# Patient Record
Sex: MALE | Race: WHITE | NOT HISPANIC OR LATINO | Employment: FULL TIME
[De-identification: names, ages, dates, MRNs, and addresses within clinical notes are randomized per-mention and may not be internally consistent; named-entity substitution may affect disease eponyms.]

---

## 2018-06-13 ENCOUNTER — CHARTING TRANS (OUTPATIENT)
Dept: OTHER | Age: 28
End: 2018-06-13

## 2018-11-19 ENCOUNTER — CHARTING TRANS (OUTPATIENT)
Dept: OTHER | Age: 28
End: 2018-11-19

## 2018-12-07 VITALS
HEIGHT: 70 IN | WEIGHT: 189.99 LBS | SYSTOLIC BLOOD PRESSURE: 120 MMHG | BODY MASS INDEX: 27.2 KG/M2 | DIASTOLIC BLOOD PRESSURE: 78 MMHG | RESPIRATION RATE: 16 BRPM | HEART RATE: 78 BPM

## 2019-02-28 ENCOUNTER — TELEPHONE (OUTPATIENT)
Dept: SCHEDULING | Age: 29
End: 2019-02-28

## 2019-03-01 ENCOUNTER — WALK IN (OUTPATIENT)
Dept: URGENT CARE | Age: 29
End: 2019-03-01

## 2019-03-01 ENCOUNTER — TELEPHONE (OUTPATIENT)
Dept: SCHEDULING | Age: 29
End: 2019-03-01

## 2019-03-01 VITALS
TEMPERATURE: 99.4 F | DIASTOLIC BLOOD PRESSURE: 80 MMHG | SYSTOLIC BLOOD PRESSURE: 118 MMHG | HEART RATE: 90 BPM | RESPIRATION RATE: 16 BRPM | HEIGHT: 70 IN | BODY MASS INDEX: 27.2 KG/M2 | OXYGEN SATURATION: 98 % | WEIGHT: 190 LBS

## 2019-03-01 DIAGNOSIS — R05.9 COUGH: ICD-10-CM

## 2019-03-01 DIAGNOSIS — R68.89 INFLUENZA-LIKE SYMPTOMS: Primary | ICD-10-CM

## 2019-03-01 LAB
FLUAV AG UPPER RESP QL IA.RAPID: NEGATIVE
FLUBV AG UPPER RESP QL IA.RAPID: NEGATIVE

## 2019-03-01 PROCEDURE — 99213 OFFICE O/P EST LOW 20 MIN: CPT | Performed by: NURSE PRACTITIONER

## 2019-03-01 PROCEDURE — 87804 INFLUENZA ASSAY W/OPTIC: CPT | Performed by: NURSE PRACTITIONER

## 2019-03-01 RX ORDER — DEXTROAMPHETAMINE SACCHARATE, AMPHETAMINE ASPARTATE MONOHYDRATE, DEXTROAMPHETAMINE SULFATE AND AMPHETAMINE SULFATE 5; 5; 5; 5 MG/1; MG/1; MG/1; MG/1
CAPSULE, EXTENDED RELEASE ORAL
COMMUNITY
End: 2022-03-23

## 2019-03-01 ASSESSMENT — ENCOUNTER SYMPTOMS
SORE THROAT: 0
CHILLS: 1
COUGH: 1
FATIGUE: 1
VOICE CHANGE: 1
DIAPHORESIS: 1
HEADACHES: 0
TROUBLE SWALLOWING: 0
FEVER: 1
WEAKNESS: 1

## 2019-03-09 ENCOUNTER — TELEPHONE (OUTPATIENT)
Dept: SCHEDULING | Age: 29
End: 2019-03-09

## 2020-04-15 ENCOUNTER — TELEPHONE (OUTPATIENT)
Dept: SCHEDULING | Age: 30
End: 2020-04-15

## 2020-04-15 ENCOUNTER — OFFICE VISIT (OUTPATIENT)
Dept: INTERNAL MEDICINE | Age: 30
End: 2020-04-15

## 2020-04-15 VITALS — BODY MASS INDEX: 27.2 KG/M2 | HEIGHT: 70 IN | WEIGHT: 190 LBS

## 2020-04-15 DIAGNOSIS — F12.90 MARIJUANA USE: ICD-10-CM

## 2020-04-15 DIAGNOSIS — J34.89 SINUS PRESSURE: ICD-10-CM

## 2020-04-15 DIAGNOSIS — R09.81 NASAL CONGESTION: Primary | ICD-10-CM

## 2020-04-15 PROCEDURE — 99213 OFFICE O/P EST LOW 20 MIN: CPT | Performed by: INTERNAL MEDICINE

## 2020-04-15 RX ORDER — FLUTICASONE PROPIONATE 50 MCG
2 SPRAY, SUSPENSION (ML) NASAL DAILY
Qty: 16 G | Refills: 3 | Status: SHIPPED | OUTPATIENT
Start: 2020-04-15 | End: 2021-07-22 | Stop reason: SDUPTHER

## 2020-04-15 ASSESSMENT — ENCOUNTER SYMPTOMS
SPEECH DIFFICULTY: 0
EYE ITCHING: 0
TROUBLE SWALLOWING: 0
VOMITING: 0
POLYDIPSIA: 0
BLOOD IN STOOL: 0
FATIGUE: 0
ACTIVITY CHANGE: 0
FEVER: 0
LIGHT-HEADEDNESS: 0
NAUSEA: 0
CHILLS: 0
EYE REDNESS: 0
POLYPHAGIA: 0
WEAKNESS: 0
DIZZINESS: 0
EYE DISCHARGE: 0
CHEST TIGHTNESS: 0
SINUS PAIN: 0
VOICE CHANGE: 0
DIARRHEA: 0
NERVOUS/ANXIOUS: 0
COLOR CHANGE: 0
HEADACHES: 0
SHORTNESS OF BREATH: 0
SINUS PRESSURE: 1
EYE PAIN: 0
PHOTOPHOBIA: 0
BRUISES/BLEEDS EASILY: 0
ABDOMINAL PAIN: 0
COUGH: 0
APPETITE CHANGE: 0
TREMORS: 0
RECTAL PAIN: 0
CONSTIPATION: 0
ABDOMINAL DISTENTION: 0
NUMBNESS: 0
SLEEP DISTURBANCE: 0
SEIZURES: 0
UNEXPECTED WEIGHT CHANGE: 0
SORE THROAT: 0
BACK PAIN: 0
WHEEZING: 0

## 2020-04-15 ASSESSMENT — PATIENT HEALTH QUESTIONNAIRE - PHQ9
SUM OF ALL RESPONSES TO PHQ9 QUESTIONS 1 AND 2: 0
2. FEELING DOWN, DEPRESSED OR HOPELESS: NOT AT ALL
SUM OF ALL RESPONSES TO PHQ9 QUESTIONS 1 AND 2: 0
1. LITTLE INTEREST OR PLEASURE IN DOING THINGS: NOT AT ALL

## 2021-02-23 ENCOUNTER — OFFICE VISIT (OUTPATIENT)
Dept: FAMILY MEDICINE CLINIC | Facility: CLINIC | Age: 31
End: 2021-02-23
Payer: COMMERCIAL

## 2021-02-23 VITALS
SYSTOLIC BLOOD PRESSURE: 116 MMHG | WEIGHT: 200 LBS | HEART RATE: 49 BPM | DIASTOLIC BLOOD PRESSURE: 76 MMHG | TEMPERATURE: 99 F | BODY MASS INDEX: 28.63 KG/M2 | HEIGHT: 70 IN

## 2021-02-23 DIAGNOSIS — Z11.1 SCREENING EXAMINATION FOR PULMONARY TUBERCULOSIS: Primary | ICD-10-CM

## 2021-02-23 PROCEDURE — 3078F DIAST BP <80 MM HG: CPT | Performed by: NURSE PRACTITIONER

## 2021-02-23 PROCEDURE — 3008F BODY MASS INDEX DOCD: CPT | Performed by: NURSE PRACTITIONER

## 2021-02-23 PROCEDURE — 99204 OFFICE O/P NEW MOD 45 MIN: CPT | Performed by: NURSE PRACTITIONER

## 2021-02-23 PROCEDURE — 86580 TB INTRADERMAL TEST: CPT | Performed by: NURSE PRACTITIONER

## 2021-02-23 PROCEDURE — 3074F SYST BP LT 130 MM HG: CPT | Performed by: NURSE PRACTITIONER

## 2021-02-23 NOTE — PROGRESS NOTES
HPI    Patient presents to establish care. Had a physical completed recently at a different clinic that did not offered TB skin testing. Review of Systems   Respiratory: Negative for cough and shortness of breath.     Cardiovascular: Negative for ches status: Not on file      Intimate partner violence        Fear of current or ex partner: Not on file        Emotionally abused: Not on file        Physically abused: Not on file        Forced sexual activity: Not on file    Other Topics      Concerns:

## 2021-02-23 NOTE — PATIENT INSTRUCTIONS
TB Screening (Skin)   Does this test have other names? Tuberculin test, TST, Mantoux, PPD (purified protein derivative)  What is this test?  This test shows if you have been infected with tuberculosis (TB).  TB is a very contagious bacterial infection th What do my test results mean? Test results may vary depending on your age, gender, health history, the method used for the test, and other things. Your test results may not mean you have a problem.  Ask your healthcare provider what your test results mean You don't need to prepare for this test.  Be sure your healthcare provider knows about all medicines, herbs, vitamins, and supplements you are taking.  This includes medicines that don't need a prescription and any illegal drugs you may use.   [employer] last

## 2021-02-25 ENCOUNTER — NURSE ONLY (OUTPATIENT)
Dept: FAMILY MEDICINE CLINIC | Facility: CLINIC | Age: 31
End: 2021-02-25
Payer: COMMERCIAL

## 2021-02-25 DIAGNOSIS — Z11.1 SCREENING-PULMONARY TB: Primary | ICD-10-CM

## 2021-02-25 LAB — INDURATION (): 0 MM (ref 0–11)

## 2021-02-25 NOTE — PROGRESS NOTES
Patient is here for tb reading. Patients full name and  verified. Tb placed on 21 at 1 pm on left forearm. Tb read negative. Patient requesting copies of results.

## 2021-04-11 ENCOUNTER — OFFICE VISIT (OUTPATIENT)
Dept: FAMILY MEDICINE CLINIC | Facility: CLINIC | Age: 31
End: 2021-04-11
Payer: COMMERCIAL

## 2021-04-11 VITALS
RESPIRATION RATE: 16 BRPM | HEART RATE: 51 BPM | BODY MASS INDEX: 28.63 KG/M2 | OXYGEN SATURATION: 98 % | TEMPERATURE: 97 F | HEIGHT: 70 IN | SYSTOLIC BLOOD PRESSURE: 120 MMHG | DIASTOLIC BLOOD PRESSURE: 74 MMHG | WEIGHT: 200 LBS

## 2021-04-11 DIAGNOSIS — J06.9 VIRAL URI WITH COUGH: Primary | ICD-10-CM

## 2021-04-11 PROCEDURE — 3078F DIAST BP <80 MM HG: CPT | Performed by: NURSE PRACTITIONER

## 2021-04-11 PROCEDURE — 87880 STREP A ASSAY W/OPTIC: CPT | Performed by: NURSE PRACTITIONER

## 2021-04-11 PROCEDURE — 3008F BODY MASS INDEX DOCD: CPT | Performed by: NURSE PRACTITIONER

## 2021-04-11 PROCEDURE — 3074F SYST BP LT 130 MM HG: CPT | Performed by: NURSE PRACTITIONER

## 2021-04-11 PROCEDURE — 99202 OFFICE O/P NEW SF 15 MIN: CPT | Performed by: NURSE PRACTITIONER

## 2021-04-11 NOTE — PROGRESS NOTES
CHIEF COMPLAINT:   Patient presents with:  Upper Respiratory Infection: Cough, sore/swollen throat since yesterday.       HPI:   Marley Sullivan is a 31year old male who presents to [facility] for COVID-19 testing with symptoms/history as described below:  Onset: 1 da CARDIOVASCULAR: denies palpitations; see HPI  GI: see HPI  NEURO: Denies dizziness; see HPI    EXAM:   /74   Pulse 51   Temp 97.3 °F (36.3 °C)   Resp 16   Ht 5' 10\" (1.778 m)   Wt 200 lb (90.7 kg)   SpO2 98%   BMI 28.70 kg/m²   GENERAL: Non-toxic, w contagious.   - Supportive care as described in Patient Instructions and as outlined below. - If applicable, notify employer/school of testing.   - Advised to follow up with PCP or IC for reevaluation for persistent/worsening symptoms within 1 week.   Renaye Cowden When you resume activity, don't let yourself get too tired. · Don't smoke. If you need help stopping, talk with your healthcare provider. · Avoid being exposed to cigarette smoke (yours or others’).   · You may use acetaminophen or ibuprofen to control pa 4178-4629 [facility] 4037. All rights reserved. This information is not intended as a substitute for professional medical care. Always follow your healthcare professional's instructions.

## 2021-04-11 NOTE — PATIENT INSTRUCTIONS
- Negative strep testing.  - COVID testing pending---- expect results within 24-48 hours. Viral Upper Respiratory Illness (Adult)    You have a viral upper respiratory illness (URI), which is another term for the common cold.  This illness is contagiou healthcare provider or pharmacist which over-the-counter medicines are safe to use. (Note: Don't use decongestants if you have high blood pressure.)  Follow-up care  Follow up with your healthcare provider, or as advised.   When to seek medical advice  Call

## 2021-05-23 ENCOUNTER — OFFICE VISIT (OUTPATIENT)
Dept: FAMILY MEDICINE CLINIC | Facility: CLINIC | Age: 31
End: 2021-05-23
Payer: COMMERCIAL

## 2021-05-23 VITALS
OXYGEN SATURATION: 98 % | SYSTOLIC BLOOD PRESSURE: 114 MMHG | WEIGHT: 205 LBS | HEART RATE: 53 BPM | HEIGHT: 70 IN | TEMPERATURE: 98 F | BODY MASS INDEX: 29.35 KG/M2 | DIASTOLIC BLOOD PRESSURE: 66 MMHG | RESPIRATION RATE: 20 BRPM

## 2021-05-23 DIAGNOSIS — B97.89 VIRAL SINUSITIS: Primary | ICD-10-CM

## 2021-05-23 DIAGNOSIS — J32.9 VIRAL SINUSITIS: Primary | ICD-10-CM

## 2021-05-23 PROCEDURE — 3074F SYST BP LT 130 MM HG: CPT | Performed by: NURSE PRACTITIONER

## 2021-05-23 PROCEDURE — 99212 OFFICE O/P EST SF 10 MIN: CPT | Performed by: NURSE PRACTITIONER

## 2021-05-23 PROCEDURE — 3078F DIAST BP <80 MM HG: CPT | Performed by: NURSE PRACTITIONER

## 2021-05-23 PROCEDURE — 3008F BODY MASS INDEX DOCD: CPT | Performed by: NURSE PRACTITIONER

## 2021-05-23 NOTE — PATIENT INSTRUCTIONS
Sinusitis (No Antibiotics)    The sinuses are air-filled spaces in the bones of the face. They connect to the inside of the nose. Sinusitis is redness and swelling (inflammation) of the tissue that lines the sinuses.  It can occur during a cold. It can al the healthcare provider, may help if allergies helped cause your sinusitis. · Use acetaminophen or ibuprofen to control pain, unless another pain medicine was prescribed to you.  If you have long-term (chronic) liver or kidney disease, or ever had a stomac mucus. This lets it drain from your sinuses more easily. Have a glass of water every hour or two. A humidifier helps in much the same way. Fluids can also offset the drying effects of certain medicines.  If you use a humidifier, follow the product maker's i instructions. You may take with food to prevent stomach upset. Try to avoid taking the medicine at bedtime. Store at room temperature in a dry place. Do not keep in the bathroom. Keep the medicine away from heat and light.   Drink plenty of water while pregnant, planning to be pregnant, or breastfeeding. Ask your pharmacist if this medicine can interact with any of your other medicines. Be sure to tell them about all the medicines you take.   Please tell all your doctors and dentists that you are on this description of this medicine available in [place]. Scan this code on your smartphone or tablet or use the web address below. You can also ask your pharmacist for a printout.  If you have any questions, please ask your pharmacist.   © 2021 [address]

## 2021-05-24 NOTE — PROGRESS NOTES
CHIEF COMPLAINT:   Patient presents with:  Nasal Congestion: seasonal allergies, worse in last 3 days, no fever, white discharge, no sinus pain      HPI:   Linn Trujillo is a 31year old male who presents for cold symptoms for  3  days.  Symptoms have progress Posterior pharynx is no erythematous. no exudates. NECK: supple, non-tender  LUNGS: clear to auscultation bilaterally, no wheezes or rhonchi. Breathing is non labored.   CARDIO: RRR without murmur  EXTREMITIES: no cyanosis, clubbing or edema  LYMPH:  no ly cause a low-grade fever. Your sinusitis doesn't include an infection with bacteria. So antibiotics aren't used to treat this problem. Home care  · Drink plenty of water, hot tea, and other liquids, as directed by the healthcare provider.  This may help th This is a rare but very serious disorder that most often affects the brain and the liver. · Use nasal rinses or irrigation as instructed by your provider. · Don't smoke. This can make symptoms worse.     Follow-up care  Follow up with your healthcare prov teaspoon of salt in 8 ounces of fresh, warm water. Use a bulb syringe to gently squirt the water into your nose a few times a day. You can also buy ready-made saline nasal sprays.    Apply hot or cold packs  Applying heat to the area surrounding your sinuse schedule. Do not take 2 doses of this medicine at one time. Please tell your doctor and pharmacist about all the medicines you take. Include both prescription and over-the-counter medicines.  Also tell them about any vitamins, herbal medicines, or anything effects from this medicine. Please speak with your doctor about what you should do if you experience these or other side effects.   · headaches  · high blood pressure  · nausea  · stomach upset or abdominal pain  If you have any of the following side effect

## 2021-07-22 ENCOUNTER — WALK IN (OUTPATIENT)
Dept: URGENT CARE | Age: 31
End: 2021-07-22

## 2021-07-22 VITALS
SYSTOLIC BLOOD PRESSURE: 90 MMHG | DIASTOLIC BLOOD PRESSURE: 70 MMHG | HEART RATE: 65 BPM | TEMPERATURE: 98 F | RESPIRATION RATE: 16 BRPM | HEIGHT: 70 IN | BODY MASS INDEX: 28.61 KG/M2 | OXYGEN SATURATION: 98 % | WEIGHT: 199.85 LBS

## 2021-07-22 DIAGNOSIS — J02.9 SORE THROAT: ICD-10-CM

## 2021-07-22 DIAGNOSIS — J01.90 ACUTE NON-RECURRENT SINUSITIS, UNSPECIFIED LOCATION: ICD-10-CM

## 2021-07-22 DIAGNOSIS — Z00.00 PE (PHYSICAL EXAM), ROUTINE: Primary | ICD-10-CM

## 2021-07-22 PROCEDURE — X0943 AMG SELF PAY VISIT: HCPCS | Performed by: NURSE PRACTITIONER

## 2021-07-22 RX ORDER — AZITHROMYCIN 250 MG/1
TABLET, FILM COATED ORAL
Qty: 6 TABLET | Refills: 0 | Status: SHIPPED | OUTPATIENT
Start: 2021-07-22 | End: 2022-03-23

## 2021-07-22 RX ORDER — FLUTICASONE PROPIONATE 50 MCG
2 SPRAY, SUSPENSION (ML) NASAL DAILY
Qty: 16 G | Refills: 0 | Status: SHIPPED | OUTPATIENT
Start: 2021-07-22 | End: 2021-08-01

## 2021-07-22 ASSESSMENT — ENCOUNTER SYMPTOMS
TROUBLE SWALLOWING: 0
VOICE CHANGE: 0
WHEEZING: 0
PSYCHIATRIC NEGATIVE: 1
SINUS PRESSURE: 1
SHORTNESS OF BREATH: 0
HEMATOLOGIC/LYMPHATIC NEGATIVE: 1
SORE THROAT: 1
ENDOCRINE NEGATIVE: 1
EYES NEGATIVE: 1
CHEST TIGHTNESS: 0
GASTROINTESTINAL NEGATIVE: 1
ALLERGIC/IMMUNOLOGIC NEGATIVE: 1
CONSTITUTIONAL NEGATIVE: 1
SINUS PAIN: 1
STRIDOR: 0
COUGH: 1
NEUROLOGICAL NEGATIVE: 1

## 2021-09-25 ENCOUNTER — TELEPHONE (OUTPATIENT)
Dept: SCHEDULING | Age: 31
End: 2021-09-25

## 2022-02-04 ENCOUNTER — APPOINTMENT (OUTPATIENT)
Dept: URGENT CARE | Age: 32
End: 2022-02-04

## 2022-02-04 ENCOUNTER — TELEPHONE (OUTPATIENT)
Dept: SCHEDULING | Age: 32
End: 2022-02-04

## 2022-03-17 ENCOUNTER — HOSPITAL ENCOUNTER (OUTPATIENT)
Dept: CV DIAGNOSTICS | Facility: HOSPITAL | Age: 32
Discharge: HOME | End: 2022-03-17
Attending: INTERNAL MEDICINE
Payer: COMMERCIAL

## 2022-03-17 DIAGNOSIS — R00.1 BRADYCARDIA, SINUS: ICD-10-CM

## 2022-03-17 PROCEDURE — 93306 TTE W/DOPPLER COMPLETE: CPT | Performed by: INTERNAL MEDICINE

## 2022-03-23 ENCOUNTER — HOSPITAL ENCOUNTER (EMERGENCY)
Age: 32
Discharge: HOME | End: 2022-03-24
Attending: EMERGENCY MEDICINE

## 2022-03-23 ENCOUNTER — APPOINTMENT (OUTPATIENT)
Dept: GENERAL RADIOLOGY | Age: 32
End: 2022-03-23
Attending: EMERGENCY MEDICINE

## 2022-03-23 DIAGNOSIS — R06.00 DYSPNEA, UNSPECIFIED TYPE: Primary | ICD-10-CM

## 2022-03-23 DIAGNOSIS — R00.2 PALPITATIONS: ICD-10-CM

## 2022-03-23 LAB
BASOPHILS # BLD: 0.1 K/MCL (ref 0–0.3)
BASOPHILS NFR BLD: 1 %
DEPRECATED RDW RBC: 35.2 FL (ref 39–50)
EOSINOPHIL # BLD: 0.3 K/MCL (ref 0–0.5)
EOSINOPHIL NFR BLD: 2 %
ERYTHROCYTE [DISTWIDTH] IN BLOOD: 11.7 % (ref 11–15)
HCT VFR BLD CALC: 42 % (ref 39–51)
HGB BLD-MCNC: 14.9 G/DL (ref 13–17)
IMM GRANULOCYTES # BLD AUTO: 0 K/MCL (ref 0–0.2)
IMM GRANULOCYTES # BLD: 0 %
LYMPHOCYTES # BLD: 3.7 K/MCL (ref 1–4.8)
LYMPHOCYTES NFR BLD: 35 %
MCH RBC QN AUTO: 29.8 PG (ref 26–34)
MCHC RBC AUTO-ENTMCNC: 35.5 G/DL (ref 32–36.5)
MCV RBC AUTO: 84 FL (ref 78–100)
MONOCYTES # BLD: 0.8 K/MCL (ref 0.3–0.9)
MONOCYTES NFR BLD: 7 %
NEUTROPHILS # BLD: 6 K/MCL (ref 1.8–7.7)
NEUTROPHILS NFR BLD: 55 %
NRBC BLD MANUAL-RTO: 0 /100 WBC
PLATELET # BLD AUTO: 321 K/MCL (ref 140–450)
RBC # BLD: 5 MIL/MCL (ref 4.5–5.9)
WBC # BLD: 10.8 K/MCL (ref 4.2–11)

## 2022-03-23 PROCEDURE — 83735 ASSAY OF MAGNESIUM: CPT | Performed by: EMERGENCY MEDICINE

## 2022-03-23 PROCEDURE — 96374 THER/PROPH/DIAG INJ IV PUSH: CPT

## 2022-03-23 PROCEDURE — 80048 BASIC METABOLIC PNL TOTAL CA: CPT | Performed by: EMERGENCY MEDICINE

## 2022-03-23 PROCEDURE — 93010 ELECTROCARDIOGRAM REPORT: CPT | Performed by: INTERNAL MEDICINE

## 2022-03-23 PROCEDURE — 93005 ELECTROCARDIOGRAM TRACING: CPT | Performed by: EMERGENCY MEDICINE

## 2022-03-23 PROCEDURE — 85025 COMPLETE CBC W/AUTO DIFF WBC: CPT | Performed by: EMERGENCY MEDICINE

## 2022-03-23 PROCEDURE — 84484 ASSAY OF TROPONIN QUANT: CPT | Performed by: EMERGENCY MEDICINE

## 2022-03-23 PROCEDURE — 71045 X-RAY EXAM CHEST 1 VIEW: CPT

## 2022-03-23 PROCEDURE — 99284 EMERGENCY DEPT VISIT MOD MDM: CPT

## 2022-03-24 VITALS
SYSTOLIC BLOOD PRESSURE: 119 MMHG | OXYGEN SATURATION: 97 % | DIASTOLIC BLOOD PRESSURE: 80 MMHG | HEART RATE: 46 BPM | TEMPERATURE: 98.1 F | RESPIRATION RATE: 15 BRPM

## 2022-03-24 PROBLEM — R51.9 FREQUENT HEADACHES: Status: ACTIVE | Noted: 2018-10-30

## 2022-03-24 PROBLEM — R06.83 SNORING: Status: ACTIVE | Noted: 2018-10-30

## 2022-03-24 PROBLEM — G47.10 HYPERSOMNIA: Status: ACTIVE | Noted: 2018-10-30

## 2022-03-24 PROBLEM — R53.83 FATIGUE: Status: ACTIVE | Noted: 2018-10-30

## 2022-03-24 LAB
ANION GAP SERPL CALC-SCNC: 8 MMOL/L (ref 10–20)
ATRIAL RATE (BPM): 58
BUN SERPL-MCNC: 14 MG/DL (ref 6–20)
BUN/CREAT SERPL: 13 (ref 7–25)
CALCIUM SERPL-MCNC: 9.4 MG/DL (ref 8.4–10.2)
CHLORIDE SERPL-SCNC: 110 MMOL/L (ref 98–107)
CO2 SERPL-SCNC: 26 MMOL/L (ref 21–32)
CREAT SERPL-MCNC: 1.11 MG/DL (ref 0.67–1.17)
FASTING DURATION TIME PATIENT: ABNORMAL H
GFR SERPLBLD BASED ON 1.73 SQ M-ARVRAT: 89 ML/MIN
GLUCOSE SERPL-MCNC: 97 MG/DL (ref 70–99)
MAGNESIUM SERPL-MCNC: 2 MG/DL (ref 1.7–2.4)
P AXIS (DEGREES): 30
POTASSIUM SERPL-SCNC: 3.1 MMOL/L (ref 3.4–5.1)
PR-INTERVAL (MSEC): 170
QRS-INTERVAL (MSEC): 98
QT-INTERVAL (MSEC): 382
QTC: 375
R AXIS (DEGREES): 53
RAINBOW EXTRA TUBES HOLD SPECIMEN: NORMAL
REPORT TEXT: NORMAL
SODIUM SERPL-SCNC: 141 MMOL/L (ref 135–145)
T AXIS (DEGREES): 45
TROPONIN I SERPL DL<=0.01 NG/ML-MCNC: 8 NG/L
VENTRICULAR RATE EKG/MIN (BPM): 58

## 2022-03-24 PROCEDURE — 10002800 HB RX 250 W HCPCS: Performed by: EMERGENCY MEDICINE

## 2022-03-24 PROCEDURE — 10002803 HB RX 637: Performed by: EMERGENCY MEDICINE

## 2022-03-24 PROCEDURE — 96374 THER/PROPH/DIAG INJ IV PUSH: CPT

## 2022-03-24 RX ORDER — ALPRAZOLAM 0.5 MG/1
0.5 TABLET ORAL 3 TIMES DAILY PRN
Qty: 20 TABLET | Refills: 0 | Status: SHIPPED | OUTPATIENT
Start: 2022-03-24

## 2022-03-24 RX ORDER — POTASSIUM CHLORIDE 20 MEQ/1
40 TABLET, EXTENDED RELEASE ORAL ONCE
Status: COMPLETED | OUTPATIENT
Start: 2022-03-24 | End: 2022-03-24

## 2022-03-24 RX ORDER — LORAZEPAM 2 MG/ML
2 INJECTION INTRAMUSCULAR ONCE
Status: COMPLETED | OUTPATIENT
Start: 2022-03-24 | End: 2022-03-24

## 2022-03-24 RX ADMIN — POTASSIUM CHLORIDE 40 MEQ: 1500 TABLET, EXTENDED RELEASE ORAL at 02:38

## 2022-03-24 RX ADMIN — LORAZEPAM 1 MG: 2 INJECTION INTRAMUSCULAR; INTRAVENOUS at 00:28

## 2022-04-04 ENCOUNTER — LAB REQUISITION (OUTPATIENT)
Dept: LAB | Age: 32
End: 2022-04-04

## 2022-04-04 DIAGNOSIS — E87.6 HYPOKALEMIA: ICD-10-CM

## 2022-04-04 DIAGNOSIS — Z00.01 ENCOUNTER FOR GENERAL ADULT MEDICAL EXAMINATION WITH ABNORMAL FINDINGS: ICD-10-CM

## 2022-04-04 DIAGNOSIS — Z13.21 ENCOUNTER FOR SCREENING FOR NUTRITIONAL DISORDER: ICD-10-CM

## 2022-04-04 DIAGNOSIS — Z13.228 ENCOUNTER FOR SCREENING FOR OTHER METABOLIC DISORDERS: ICD-10-CM

## 2022-04-04 DIAGNOSIS — Z13.220 ENCOUNTER FOR SCREENING FOR LIPOID DISORDERS: ICD-10-CM

## 2022-04-04 DIAGNOSIS — Z13.29 ENCOUNTER FOR SCREENING FOR OTHER SUSPECTED ENDOCRINE DISORDER: ICD-10-CM

## 2022-04-04 PROCEDURE — 80061 LIPID PANEL: CPT | Performed by: CLINICAL MEDICAL LABORATORY

## 2022-04-04 PROCEDURE — 80050 GENERAL HEALTH PANEL: CPT | Performed by: CLINICAL MEDICAL LABORATORY

## 2022-04-04 PROCEDURE — 82306 VITAMIN D 25 HYDROXY: CPT | Performed by: CLINICAL MEDICAL LABORATORY

## 2022-04-04 PROCEDURE — 83036 HEMOGLOBIN GLYCOSYLATED A1C: CPT | Performed by: CLINICAL MEDICAL LABORATORY

## 2022-04-05 LAB
25(OH)D3+25(OH)D2 SERPL-MCNC: 15.3 NG/ML (ref 30–100)
ALBUMIN SERPL-MCNC: 4.2 G/DL (ref 3.6–5.1)
ALBUMIN/GLOB SERPL: 1.4 {RATIO} (ref 1–2.4)
ALP SERPL-CCNC: 73 UNITS/L (ref 45–117)
ALT SERPL-CCNC: 65 UNITS/L
ANION GAP SERPL CALC-SCNC: 11 MMOL/L (ref 10–20)
AST SERPL-CCNC: 55 UNITS/L
BASOPHILS # BLD: 0.1 K/MCL (ref 0–0.3)
BASOPHILS NFR BLD: 1 %
BILIRUB SERPL-MCNC: 0.6 MG/DL (ref 0.2–1)
BUN SERPL-MCNC: 16 MG/DL (ref 6–20)
BUN/CREAT SERPL: 16 (ref 7–25)
CALCIUM SERPL-MCNC: 9.4 MG/DL (ref 8.4–10.2)
CHLORIDE SERPL-SCNC: 108 MMOL/L (ref 98–107)
CHOLEST SERPL-MCNC: 224 MG/DL
CHOLEST/HDLC SERPL: 5.2 {RATIO}
CO2 SERPL-SCNC: 25 MMOL/L (ref 21–32)
CREAT SERPL-MCNC: 0.98 MG/DL (ref 0.67–1.17)
DEPRECATED RDW RBC: 40.2 FL (ref 39–50)
EOSINOPHIL # BLD: 0.2 K/MCL (ref 0–0.5)
EOSINOPHIL NFR BLD: 2 %
ERYTHROCYTE [DISTWIDTH] IN BLOOD: 12.4 % (ref 11–15)
FASTING DURATION TIME PATIENT: ABNORMAL H
FASTING DURATION TIME PATIENT: ABNORMAL H
GFR SERPLBLD BASED ON 1.73 SQ M-ARVRAT: >90 ML/MIN
GLOBULIN SER-MCNC: 3 G/DL (ref 2–4)
GLUCOSE SERPL-MCNC: 93 MG/DL (ref 70–99)
HBA1C MFR BLD: 5.2 % (ref 4.5–5.6)
HCT VFR BLD CALC: 45.6 % (ref 39–51)
HDLC SERPL-MCNC: 43 MG/DL
HGB BLD-MCNC: 15.7 G/DL (ref 13–17)
IMM GRANULOCYTES # BLD AUTO: 0 K/MCL (ref 0–0.2)
IMM GRANULOCYTES # BLD: 0 %
LDLC SERPL CALC-MCNC: 159 MG/DL
LYMPHOCYTES # BLD: 2.4 K/MCL (ref 1–4.8)
LYMPHOCYTES NFR BLD: 25 %
MCH RBC QN AUTO: 30.6 PG (ref 26–34)
MCHC RBC AUTO-ENTMCNC: 34.4 G/DL (ref 32–36.5)
MCV RBC AUTO: 88.9 FL (ref 78–100)
MONOCYTES # BLD: 0.5 K/MCL (ref 0.3–0.9)
MONOCYTES NFR BLD: 5 %
NEUTROPHILS # BLD: 6.6 K/MCL (ref 1.8–7.7)
NEUTROPHILS NFR BLD: 67 %
NONHDLC SERPL-MCNC: 181 MG/DL
NRBC BLD MANUAL-RTO: 0 /100 WBC
PLATELET # BLD AUTO: 332 K/MCL (ref 140–450)
POTASSIUM SERPL-SCNC: 3.9 MMOL/L (ref 3.4–5.1)
PROT SERPL-MCNC: 7.2 G/DL (ref 6.4–8.2)
RBC # BLD: 5.13 MIL/MCL (ref 4.5–5.9)
SODIUM SERPL-SCNC: 140 MMOL/L (ref 135–145)
TRIGL SERPL-MCNC: 112 MG/DL
TSH SERPL-ACNC: 1.06 MCUNITS/ML (ref 0.35–5)
WBC # BLD: 9.8 K/MCL (ref 4.2–11)

## 2022-04-06 ENCOUNTER — HOSPITAL ENCOUNTER (OUTPATIENT)
Dept: MRI IMAGING | Age: 32
Discharge: HOME | End: 2022-04-06
Attending: INTERNAL MEDICINE
Payer: COMMERCIAL

## 2022-04-06 DIAGNOSIS — G93.0 ARACHNOID CYST: ICD-10-CM

## 2022-04-06 PROCEDURE — A9575 INJ GADOTERATE MEGLUMI 0.1ML: HCPCS | Performed by: RADIOLOGY

## 2022-04-06 PROCEDURE — 70553 MRI BRAIN STEM W/O & W/DYE: CPT | Performed by: INTERNAL MEDICINE

## 2024-03-22 ENCOUNTER — APPOINTMENT (OUTPATIENT)
Dept: URGENT CARE | Age: 34
End: 2024-03-22

## 2024-03-23 ENCOUNTER — APPOINTMENT (OUTPATIENT)
Dept: URGENT CARE | Age: 34
End: 2024-03-23

## 2024-03-23 ENCOUNTER — V-VISIT (OUTPATIENT)
Dept: URGENT CARE | Age: 34
End: 2024-03-23

## 2024-03-23 VITALS
RESPIRATION RATE: 18 BRPM | BODY MASS INDEX: 28.63 KG/M2 | WEIGHT: 200 LBS | HEIGHT: 70 IN | HEART RATE: 61 BPM | DIASTOLIC BLOOD PRESSURE: 90 MMHG | OXYGEN SATURATION: 99 % | SYSTOLIC BLOOD PRESSURE: 139 MMHG | TEMPERATURE: 98.3 F

## 2024-03-23 DIAGNOSIS — B96.89 ACUTE BACTERIAL SINUSITIS: Primary | ICD-10-CM

## 2024-03-23 DIAGNOSIS — J01.90 ACUTE BACTERIAL SINUSITIS: Primary | ICD-10-CM

## 2024-03-23 DIAGNOSIS — H60.501 ACUTE OTITIS EXTERNA OF RIGHT EAR, UNSPECIFIED TYPE: ICD-10-CM

## 2024-03-23 RX ORDER — DOXYCYCLINE HYCLATE 100 MG
100 TABLET ORAL 2 TIMES DAILY
Qty: 20 TABLET | Refills: 0 | Status: SHIPPED | OUTPATIENT
Start: 2024-03-23 | End: 2024-04-02

## 2024-03-23 RX ORDER — NEOMYCIN SULFATE, POLYMYXIN B SULFATE AND HYDROCORTISONE 10; 3.5; 1 MG/ML; MG/ML; [USP'U]/ML
3 SUSPENSION/ DROPS AURICULAR (OTIC) 4 TIMES DAILY
Qty: 10 ML | Refills: 0 | Status: SHIPPED | OUTPATIENT
Start: 2024-03-23 | End: 2024-03-30

## 2024-03-23 ASSESSMENT — ENCOUNTER SYMPTOMS
HEADACHES: 0
SINUS PAIN: 1
FATIGUE: 0
CHILLS: 0
FEVER: 0
SORE THROAT: 0
SINUS PRESSURE: 1